# Patient Record
Sex: FEMALE | Race: BLACK OR AFRICAN AMERICAN
[De-identification: names, ages, dates, MRNs, and addresses within clinical notes are randomized per-mention and may not be internally consistent; named-entity substitution may affect disease eponyms.]

---

## 2017-01-24 ENCOUNTER — HOSPITAL ENCOUNTER (OUTPATIENT)
Dept: HOSPITAL 62 - OD | Age: 82
End: 2017-01-24
Attending: INTERNAL MEDICINE
Payer: MEDICARE

## 2017-01-24 DIAGNOSIS — Z79.899: ICD-10-CM

## 2017-01-24 DIAGNOSIS — Z79.01: Primary | ICD-10-CM

## 2017-01-24 LAB
ALBUMIN SERPL-MCNC: 3.7 G/DL (ref 3.5–5)
ALP SERPL-CCNC: 107 U/L (ref 38–126)
ALT SERPL-CCNC: 27 U/L (ref 9–52)
ANION GAP SERPL CALC-SCNC: 9 MMOL/L (ref 5–19)
APPEARANCE UR: (no result)
AST SERPL-CCNC: 27 U/L (ref 14–36)
BASOPHILS # BLD AUTO: 0 10^3/UL (ref 0–0.2)
BASOPHILS NFR BLD AUTO: 0.9 % (ref 0–2)
BILIRUB DIRECT SERPL-MCNC: 0 MG/DL (ref 0–0.3)
BILIRUB SERPL-MCNC: 0.7 MG/DL (ref 0.2–1.3)
BILIRUB UR QL STRIP: NEGATIVE
BUN SERPL-MCNC: 22 MG/DL (ref 7–20)
CALCIUM: 10.7 MG/DL (ref 8.4–10.2)
CHLORIDE SERPL-SCNC: 102 MMOL/L (ref 98–107)
CO2 SERPL-SCNC: 31 MMOL/L (ref 22–30)
CREAT SERPL-MCNC: 0.76 MG/DL (ref 0.52–1.25)
EOSINOPHIL # BLD AUTO: 0.1 10^3/UL (ref 0–0.6)
EOSINOPHIL NFR BLD AUTO: 2.6 % (ref 0–6)
ERYTHROCYTE [DISTWIDTH] IN BLOOD BY AUTOMATED COUNT: 14.1 % (ref 11.5–14)
GLUCOSE SERPL-MCNC: 86 MG/DL (ref 75–110)
GLUCOSE UR STRIP-MCNC: NEGATIVE MG/DL
HCT VFR BLD CALC: 37.5 % (ref 36–47)
HGB BLD-MCNC: 12.3 G/DL (ref 12–15.5)
HGB HCT DIFFERENCE: -0.6
KETONES UR STRIP-MCNC: NEGATIVE MG/DL
LYMPHOCYTES # BLD AUTO: 1.7 10^3/UL (ref 0.5–4.7)
LYMPHOCYTES NFR BLD AUTO: 40.8 % (ref 13–45)
MCH RBC QN AUTO: 32.4 PG (ref 27–33.4)
MCHC RBC AUTO-ENTMCNC: 32.8 G/DL (ref 32–36)
MCV RBC AUTO: 99 FL (ref 80–97)
MONOCYTES # BLD AUTO: 0.5 10^3/UL (ref 0.1–1.4)
MONOCYTES NFR BLD AUTO: 12.4 % (ref 3–13)
NEUTROPHILS # BLD AUTO: 1.8 10^3/UL (ref 1.7–8.2)
NEUTS SEG NFR BLD AUTO: 43.3 % (ref 42–78)
NITRITE UR QL STRIP: NEGATIVE
PH UR STRIP: 7 [PH] (ref 5–9)
POTASSIUM SERPL-SCNC: 5.2 MMOL/L (ref 3.6–5)
PROT SERPL-MCNC: 7.3 G/DL (ref 6.3–8.2)
PROT UR STRIP-MCNC: NEGATIVE MG/DL
RBC # BLD AUTO: 3.8 10^6/UL (ref 3.72–5.28)
SODIUM SERPL-SCNC: 142 MMOL/L (ref 137–145)
SP GR UR STRIP: 1.02
UROBILINOGEN UR-MCNC: NEGATIVE MG/DL (ref ?–2)
WBC # BLD AUTO: 4.2 10^3/UL (ref 4–10.5)

## 2017-01-24 PROCEDURE — 80076 HEPATIC FUNCTION PANEL: CPT

## 2017-01-24 PROCEDURE — 82272 OCCULT BLD FECES 1-3 TESTS: CPT

## 2017-01-24 PROCEDURE — 81001 URINALYSIS AUTO W/SCOPE: CPT

## 2017-01-24 PROCEDURE — 80048 BASIC METABOLIC PNL TOTAL CA: CPT

## 2017-01-24 PROCEDURE — 36415 COLL VENOUS BLD VENIPUNCTURE: CPT

## 2017-01-24 PROCEDURE — 85025 COMPLETE CBC W/AUTO DIFF WBC: CPT

## 2017-01-24 PROCEDURE — 85730 THROMBOPLASTIN TIME PARTIAL: CPT

## 2017-02-24 ENCOUNTER — HOSPITAL ENCOUNTER (OUTPATIENT)
Dept: HOSPITAL 62 - OD | Age: 82
End: 2017-02-24
Attending: INTERNAL MEDICINE
Payer: MEDICARE

## 2017-02-24 DIAGNOSIS — Z79.899: ICD-10-CM

## 2017-02-24 DIAGNOSIS — Z79.01: Primary | ICD-10-CM

## 2017-02-24 LAB
ANION GAP SERPL CALC-SCNC: 10 MMOL/L (ref 5–19)
BUN SERPL-MCNC: 25 MG/DL (ref 7–20)
CALCIUM: 10.7 MG/DL (ref 8.4–10.2)
CHLORIDE SERPL-SCNC: 103 MMOL/L (ref 98–107)
CO2 SERPL-SCNC: 29 MMOL/L (ref 22–30)
CREAT SERPL-MCNC: 0.79 MG/DL (ref 0.52–1.25)
ERYTHROCYTE [DISTWIDTH] IN BLOOD BY AUTOMATED COUNT: 13.6 % (ref 11.5–14)
GLUCOSE SERPL-MCNC: 94 MG/DL (ref 75–110)
HCT VFR BLD CALC: 36.5 % (ref 36–47)
HGB BLD-MCNC: 12.1 G/DL (ref 12–15.5)
HGB HCT DIFFERENCE: -0.2
MCH RBC QN AUTO: 32.6 PG (ref 27–33.4)
MCHC RBC AUTO-ENTMCNC: 33.2 G/DL (ref 32–36)
MCV RBC AUTO: 98 FL (ref 80–97)
POTASSIUM SERPL-SCNC: 4.8 MMOL/L (ref 3.6–5)
RBC # BLD AUTO: 3.72 10^6/UL (ref 3.72–5.28)
SODIUM SERPL-SCNC: 142.4 MMOL/L (ref 137–145)
WBC # BLD AUTO: 3.9 10^3/UL (ref 4–10.5)

## 2017-02-24 PROCEDURE — 80048 BASIC METABOLIC PNL TOTAL CA: CPT

## 2017-02-24 PROCEDURE — 82272 OCCULT BLD FECES 1-3 TESTS: CPT

## 2017-02-24 PROCEDURE — 36415 COLL VENOUS BLD VENIPUNCTURE: CPT

## 2017-02-24 PROCEDURE — 85027 COMPLETE CBC AUTOMATED: CPT

## 2017-04-10 ENCOUNTER — HOSPITAL ENCOUNTER (EMERGENCY)
Dept: HOSPITAL 62 - ER | Age: 82
Discharge: HOME | End: 2017-04-10
Payer: MEDICARE

## 2017-04-10 DIAGNOSIS — M54.6: Primary | ICD-10-CM

## 2017-04-10 DIAGNOSIS — M54.9: ICD-10-CM

## 2017-04-10 PROCEDURE — 93005 ELECTROCARDIOGRAM TRACING: CPT

## 2017-04-10 PROCEDURE — 93010 ELECTROCARDIOGRAM REPORT: CPT

## 2017-04-10 PROCEDURE — 99283 EMERGENCY DEPT VISIT LOW MDM: CPT

## 2017-04-10 PROCEDURE — 71020: CPT

## 2017-04-10 NOTE — ER DOCUMENT REPORT
ED Medical Screen (RME)





- General


Chief Complaint: Back Pain


Stated Complaint: BACK PAIN


Notes: 


Patient says that she's having a sharp pain in the right mid posterior thoracic 

region since about 1 PM today.  It comes and goes.  Movement seems to make it 

worse.  Did not get relief with Tylenol.  Patient recalls no injuries.  Does 

remember that about 6 AM this morning, she had an "accident", not making it to 

the toilet in time and after that had to mop the floor, but did not have any 

pain then.  Her pain began about 6 or 7 hours later.  No vomiting and no 

diarrhea.  No URI symptoms.  No cough or cold or chest congestion.  No fevers.





Patient is very tender to touch in the mid right posterior thoracic rib region.

  No anterior chest pain.


TRAVEL OUTSIDE OF THE [place] IN LAST 30 DAYS: No





- Related Data


Allergies/Adverse Reactions: 


 





amoxicillin [Amoxicillin] Allergy (Intermediate, Verified 04/10/17 19:01)


 rash, itching, swelling











Past Medical History





- Past Medical History


Cardiac Medical History: Reports: Hx Atrial Fibrillation, Hx Coronary Artery 

Disease, Hx Hypercholesterolemia, Hx Hypertension


Pulmonary Medical History: Reports: Hx Pneumonia


   Denies: Hx Tuberculosis


Neurological Medical History: 


Renal/ Medical History: Denies: Hx Peritoneal Dialysis


Musculoskeltal Medical History: Reports Hx Arthritis


Past Surgical History: Reports: Hx Abdominal Surgery, Hx Bowel Surgery, Hx 

Umbilical Hernia





- Immunizations


Immunizations up to date: No


Hx Diphtheria, Pertussis, Tetanus Vaccination: No





Physical Exam





- Vital signs


Vitals: 





 











Temp Pulse Resp BP Pulse Ox


 


 98.4 F   89   16   161/104 H  97 


 


 04/10/17 18:49  04/10/17 18:49  04/10/17 18:49  04/10/17 18:49  04/10/17 18:49














Course





- Vital Signs


Vital signs: 





 











Temp Pulse Resp BP Pulse Ox


 


 98.4 F   89   16   161/104 H  97 


 


 04/10/17 18:49  04/10/17 18:49  04/10/17 18:49  04/10/17 18:49  04/10/17 18:49

## 2017-04-10 NOTE — ER DOCUMENT REPORT
ED General





- General


Chief Complaint: Back Pain


Stated Complaint: BACK PAIN


Time seen by provider: 21:45


Notes: 


Patient is an 88-year-old female that comes emergency department with chief 

complaint of pain in her mid to upper back on the right side only, symptoms 

started at about 2 PM, she states that about 6 hours earlier she had been 

mopping but she is unsure if this is related.  She denies shortness of breath, 

chest pain, dizziness, focal numbness or weakness.  Patient states the symptoms 

are worse when she moves and are intermittently sharp. 


TRAVEL OUTSIDE OF THE [place] IN LAST 30 DAYS: No





- Related Data


Allergies/Adverse Reactions: 


 





amoxicillin [Amoxicillin] Allergy (Intermediate, Verified 04/10/17 19:01)


 rash, itching, swelling











Past Medical History





- General


Information source: Patient, Relative





- Social History


Smoking Status: Never Smoker


Frequency of alcohol use: None


Drug Abuse: None


Lives with: Family


Family History: CAD, Hypertension





- Past Medical History


Cardiac Medical History: Reports: Hx Atrial Fibrillation, Hx Coronary Artery 

Disease, Hx Hypercholesterolemia, Hx Hypertension


Pulmonary Medical History: Reports: Hx Pneumonia


   Denies: Hx Tuberculosis


Neurological Medical History: 


Renal/ Medical History: Denies: Hx Peritoneal Dialysis


Musculoskeltal Medical History: Reports Hx Arthritis


Past Surgical History: Reports: Hx Abdominal Surgery, Hx Bowel Surgery, Hx 

Umbilical Hernia





- Immunizations


Immunizations up to date: No


Hx Diphtheria, Pertussis, Tetanus Vaccination: No





Review of Systems





- Review of Systems


Constitutional: No symptoms reported


EENT: No symptoms reported


Cardiovascular: See HPI


Respiratory: No symptoms reported


Gastrointestinal: No symptoms reported


Genitourinary: No symptoms reported


Female Genitourinary: No symptoms reported


Musculoskeletal: See HPI


Skin: No symptoms reported


Hematologic/Lymphatic: No symptoms reported


Neurological/Psychological: No symptoms reported





Physical Exam





- Vital signs


Vitals: 


 











Temp Pulse Resp BP Pulse Ox


 


 98.4 F   89   16   161/104 H  97 


 


 04/10/17 18:49  04/10/17 18:49  04/10/17 18:49  04/10/17 18:49  04/10/17 18:49











Interpretation: Normal





- General


General appearance: Appears well, Alert


In distress: None - Patient winces when she moves, otherwise she is in no 

distress





- HEENT


Head: Normocephalic, Atraumatic


Eyes: Normal


Conjunctiva: Normal


Extraocular movements intact: Yes


Eyelashes: Normal


Pupils: PERRL


Mouth/Lips: Normal


Mucous membranes: Normal


Pharynx: Normal


Neck: Normal





- Respiratory


Respiratory status: No respiratory distress


Chest status: Nontender.  No: Tender


Breath sounds: Normal.  No: Decreased air movement, Wheezing


Chest palpation: Normal





- Cardiovascular


Rhythm: Regular.  No: Tachycardia


Heart sounds: Normal auscultation, S1 appreciated, S2 appreciated


Murmur: No





- Abdominal


Inspection: Normal


Distension: No distension


Bowel sounds: Normal


Tenderness: Nontender.  No: Tender, Guarding


Organomegaly: No organomegaly





- Back


Back: Tender - Patient tender in the right upper thoracic paraspinal muscle 

region with tender muscle fibers with spasm noted, full upper and lower 

extremity range of motion, normal distal neurovascular exam, no saddle 

anesthesia, no midline tenderness.





- Extremities


General upper extremity: Normal inspection, Nontender, Normal color, Normal ROM

, Normal temperature


General lower extremity: Normal inspection, Nontender, Normal color, Normal ROM

, Normal temperature, Normal weight bearing.  No: Tabatha's sign





- Neurological


Neuro grossly intact: Yes


Cognition: Normal


Orientation: AAOx4


Leonard Coma Scale Eye Opening: Spontaneous


Leonard Coma Scale Verbal: Oriented


[place]ard Coma Scale Motor: Obeys Commands


[place] Coma Scale Total: 15


Speech: Normal


Motor strength normal: LUE, RUE, LLE, RLE


Sensory: Normal





- Psychological


Associated symptoms: Normal affect, Normal mood





- Skin


Skin Temperature: Warm


Skin Moisture: Dry


Skin Color: Normal





Course





- Re-evaluation


Re-evalutation: 


EKG showing a regular rhythm, consistent with atrial fibrillation, no T-wave 

inversions in consecutive leads, no ST segment changes.  Patient has known 

history of atrial fibrillation, previous EKG shows sinus rhythm.  Patient is on 

eliquis. CXR showing stable cardiac enlargement. 





On examination patient is very specific and reproducible muscular pain in her 

right upper thoracic region, worse with movement.  No acute abnormalities noted

, no chest pain reported, patient even sits with some discomfort.  Patient is 

not tachycardic, she is generally well-appearing.  I have low suspicion of 

aortic dissection, acute coronary syndrome, PE based on examination and 

described symptoms.  Will give pain control, will place on laxative, discussed 

treatment of the area, discussed follow-up, discussed return precautions for 

any worsening symptoms, patient and daughter state understanding and agreement.





- Vital Signs


Vital signs: 


 











Temp Pulse Resp BP Pulse Ox


 


 97.4 F   61   18   165/95 H  100 


 


 04/10/17 22:24  04/10/17 22:24  04/10/17 22:24  04/10/17 22:24  04/10/17 22:24














Discharge





- Discharge


Clinical Impression: 


 Upper back pain on right side





Condition: Stable


Disposition: HOME, SELF-CARE


Additional Instructions: 


By workup and examination it appears your symptoms are musculoskeletal, rest, 

apply heat to the area, take pain medication (and stool softener to avoid 

constipation from the pain medication).


Follow-up with primary care in the next several days for additional management.


Return if you worsen in any way including difficulty breathing, chest pain, 

numbness, fever, or any other concerning symptoms.


Prescriptions: 


Docusate Sodium [Colace 100 mg Capsule] 100 mg PO DAILY #30 capsule


Hydrocodone/Acetaminophen [Norco 5-325 mg Tablet] 1 - 2 tab PO ASDIR #10 tablet


Referrals: 


MAYRA BOWLES MD [Primary Care Provider] - Follow up as needed

## 2017-04-10 NOTE — ER DOCUMENT REPORT
ED General





- General


Chief Complaint: Back Pain


Stated Complaint: BACK PAIN


TRAVEL OUTSIDE OF THE [place] IN LAST 30 DAYS: No





- Related Data


Allergies/Adverse Reactions: 


 





amoxicillin [Amoxicillin] Allergy (Intermediate, Verified 04/10/17 19:01)


 rash, itching, swelling











Past Medical History





- Social History


Family History: CAD, Hypertension





- Past Medical History


Cardiac Medical History: Reports: Hx Atrial Fibrillation, Hx Coronary Artery 

Disease, Hx Hypercholesterolemia, Hx Hypertension


Pulmonary Medical History: Reports: Hx Pneumonia


   Denies: Hx Tuberculosis


Neurological Medical History: 


Renal/ Medical History: Denies: Hx Peritoneal Dialysis


Musculoskeltal Medical History: Reports Hx Arthritis


Past Surgical History: Reports: Hx Abdominal Surgery, Hx Bowel Surgery, Hx 

Umbilical Hernia





- Immunizations


Immunizations up to date: No


Hx Diphtheria, Pertussis, Tetanus Vaccination: No





Physical Exam





- Vital signs


Vitals: 





 











Temp Pulse Resp BP Pulse Ox


 


 98.4 F   89   16   161/104 H  97 


 


 04/10/17 18:49  04/10/17 18:49  04/10/17 18:49  04/10/17 18:49  04/10/17 18:49














Course





- Re-evaluation


Re-evalutation: 


EKG showing a regular rhythm, consistent with atrial fibrillation, no T-wave 

inversions in consecutive leads, no ST segment changes.  Patient has known 

history of atrial fibrillation, previous EKG shows sinus rhythm.  Patient is on 

eliquis. CXR showing stable cardiac enlargement. 





- Vital Signs


Vital signs: 





 











Temp Pulse Resp BP Pulse Ox


 


 98.4 F   89   16   161/104 H  97 


 


 04/10/17 18:49  04/10/17 18:49  04/10/17 18:49  04/10/17 18:49  04/10/17 18:49

## 2017-04-11 VITALS — SYSTOLIC BLOOD PRESSURE: 165 MMHG | DIASTOLIC BLOOD PRESSURE: 95 MMHG

## 2017-04-11 NOTE — EKG REPORT
SEVERITY:- ABNORMAL ECG -

ATRIAL FIBRILLATION, V-RATE 57-67

INCOMPLETE RIGHT BUNDLE BRANCH BLOCK

ABNORMAL T, CONSIDER ISCHEMIA, INFERIOR LEADS

:

Confirmed by: Leland Chang MD 11-Apr-2017 08:15:49

## 2017-04-20 ENCOUNTER — HOSPITAL ENCOUNTER (OUTPATIENT)
Dept: HOSPITAL 62 - OD | Age: 82
End: 2017-04-20
Attending: INTERNAL MEDICINE
Payer: MEDICARE

## 2017-04-20 DIAGNOSIS — Z79.899: ICD-10-CM

## 2017-04-20 DIAGNOSIS — Z79.01: Primary | ICD-10-CM

## 2017-04-20 LAB
ALBUMIN SERPL-MCNC: 4.2 G/DL (ref 3.5–5)
ALP SERPL-CCNC: 89 U/L (ref 38–126)
ALT SERPL-CCNC: 28 U/L (ref 9–52)
ANION GAP SERPL CALC-SCNC: 11 MMOL/L (ref 5–19)
APPEARANCE UR: CLEAR
AST SERPL-CCNC: 26 U/L (ref 14–36)
BASOPHILS # BLD AUTO: 0 10^3/UL (ref 0–0.2)
BASOPHILS NFR BLD AUTO: 0.7 % (ref 0–2)
BILIRUB DIRECT SERPL-MCNC: 0.1 MG/DL (ref 0–0.4)
BILIRUB SERPL-MCNC: 0.8 MG/DL (ref 0.2–1.3)
BILIRUB UR QL STRIP: NEGATIVE
BUN SERPL-MCNC: 21 MG/DL (ref 7–20)
CALCIUM: 10.8 MG/DL (ref 8.4–10.2)
CHLORIDE SERPL-SCNC: 101 MMOL/L (ref 98–107)
CO2 SERPL-SCNC: 30 MMOL/L (ref 22–30)
CREAT SERPL-MCNC: 0.71 MG/DL (ref 0.52–1.25)
EOSINOPHIL # BLD AUTO: 0.1 10^3/UL (ref 0–0.6)
EOSINOPHIL NFR BLD AUTO: 2.2 % (ref 0–6)
ERYTHROCYTE [DISTWIDTH] IN BLOOD BY AUTOMATED COUNT: 13.6 % (ref 11.5–14)
GLUCOSE SERPL-MCNC: 91 MG/DL (ref 75–110)
GLUCOSE UR STRIP-MCNC: NEGATIVE MG/DL
HCT VFR BLD CALC: 37.4 % (ref 36–47)
HGB BLD-MCNC: 12.4 G/DL (ref 12–15.5)
HGB HCT DIFFERENCE: -0.2
KETONES UR STRIP-MCNC: NEGATIVE MG/DL
LYMPHOCYTES # BLD AUTO: 1.5 10^3/UL (ref 0.5–4.7)
LYMPHOCYTES NFR BLD AUTO: 43.6 % (ref 13–45)
MCH RBC QN AUTO: 32.3 PG (ref 27–33.4)
MCHC RBC AUTO-ENTMCNC: 33.2 G/DL (ref 32–36)
MCV RBC AUTO: 97 FL (ref 80–97)
MONOCYTES # BLD AUTO: 0.4 10^3/UL (ref 0.1–1.4)
MONOCYTES NFR BLD AUTO: 12.1 % (ref 3–13)
NEUTROPHILS # BLD AUTO: 1.4 10^3/UL (ref 1.7–8.2)
NEUTS SEG NFR BLD AUTO: 41.4 % (ref 42–78)
NITRITE UR QL STRIP: NEGATIVE
PH UR STRIP: 7 [PH] (ref 5–9)
POTASSIUM SERPL-SCNC: 4.9 MMOL/L (ref 3.6–5)
PROT SERPL-MCNC: 7.5 G/DL (ref 6.3–8.2)
PROT UR STRIP-MCNC: NEGATIVE MG/DL
RBC # BLD AUTO: 3.84 10^6/UL (ref 3.72–5.28)
RBC #/AREA URNS HPF: (no result) /HPF
SODIUM SERPL-SCNC: 142.4 MMOL/L (ref 137–145)
SP GR UR STRIP: 1.01
UROBILINOGEN UR-MCNC: NEGATIVE MG/DL (ref ?–2)
WBC # BLD AUTO: 3.4 10^3/UL (ref 4–10.5)
WBC #/AREA URNS HPF: (no result) /HPF

## 2017-04-20 PROCEDURE — 80076 HEPATIC FUNCTION PANEL: CPT

## 2017-04-20 PROCEDURE — 85025 COMPLETE CBC W/AUTO DIFF WBC: CPT

## 2017-04-20 PROCEDURE — 85730 THROMBOPLASTIN TIME PARTIAL: CPT

## 2017-04-20 PROCEDURE — 82272 OCCULT BLD FECES 1-3 TESTS: CPT

## 2017-04-20 PROCEDURE — 80048 BASIC METABOLIC PNL TOTAL CA: CPT

## 2017-04-20 PROCEDURE — 81001 URINALYSIS AUTO W/SCOPE: CPT

## 2017-04-20 PROCEDURE — 36415 COLL VENOUS BLD VENIPUNCTURE: CPT

## 2017-04-28 ENCOUNTER — HOSPITAL ENCOUNTER (EMERGENCY)
Dept: HOSPITAL 62 - ER | Age: 82
Discharge: HOME | End: 2017-04-28
Payer: MEDICARE

## 2017-04-28 VITALS — DIASTOLIC BLOOD PRESSURE: 74 MMHG | SYSTOLIC BLOOD PRESSURE: 144 MMHG

## 2017-04-28 DIAGNOSIS — R05: ICD-10-CM

## 2017-04-28 DIAGNOSIS — R19.7: ICD-10-CM

## 2017-04-28 DIAGNOSIS — Z88.0: ICD-10-CM

## 2017-04-28 DIAGNOSIS — I11.0: ICD-10-CM

## 2017-04-28 DIAGNOSIS — Z87.01: ICD-10-CM

## 2017-04-28 DIAGNOSIS — I48.91: ICD-10-CM

## 2017-04-28 DIAGNOSIS — J20.9: Primary | ICD-10-CM

## 2017-04-28 DIAGNOSIS — I25.10: ICD-10-CM

## 2017-04-28 DIAGNOSIS — Z79.01: ICD-10-CM

## 2017-04-28 DIAGNOSIS — R35.0: ICD-10-CM

## 2017-04-28 DIAGNOSIS — I50.9: ICD-10-CM

## 2017-04-28 LAB
ANION GAP SERPL CALC-SCNC: 12 MMOL/L (ref 5–19)
APPEARANCE UR: CLEAR
BASOPHILS # BLD AUTO: 0 10^3/UL (ref 0–0.2)
BASOPHILS NFR BLD AUTO: 0.5 % (ref 0–2)
BILIRUB UR QL STRIP: NEGATIVE
BUN SERPL-MCNC: 13 MG/DL (ref 7–20)
CALCIUM: 10.9 MG/DL (ref 8.4–10.2)
CHLORIDE SERPL-SCNC: 99 MMOL/L (ref 98–107)
CO2 SERPL-SCNC: 29 MMOL/L (ref 22–30)
CREAT SERPL-MCNC: 0.68 MG/DL (ref 0.52–1.25)
EOSINOPHIL # BLD AUTO: 0 10^3/UL (ref 0–0.6)
EOSINOPHIL NFR BLD AUTO: 0.6 % (ref 0–6)
ERYTHROCYTE [DISTWIDTH] IN BLOOD BY AUTOMATED COUNT: 13.9 % (ref 11.5–14)
GLUCOSE SERPL-MCNC: 108 MG/DL (ref 75–110)
GLUCOSE UR STRIP-MCNC: NEGATIVE MG/DL
HCT VFR BLD CALC: 37.7 % (ref 36–47)
HGB BLD-MCNC: 12.5 G/DL (ref 12–15.5)
HGB HCT DIFFERENCE: -0.2
KETONES UR STRIP-MCNC: NEGATIVE MG/DL
LYMPHOCYTES # BLD AUTO: 1 10^3/UL (ref 0.5–4.7)
LYMPHOCYTES NFR BLD AUTO: 15.8 % (ref 13–45)
MCH RBC QN AUTO: 32.3 PG (ref 27–33.4)
MCHC RBC AUTO-ENTMCNC: 33.2 G/DL (ref 32–36)
MCV RBC AUTO: 97 FL (ref 80–97)
MONOCYTES # BLD AUTO: 0.7 10^3/UL (ref 0.1–1.4)
MONOCYTES NFR BLD AUTO: 12.1 % (ref 3–13)
NEUTROPHILS # BLD AUTO: 4.3 10^3/UL (ref 1.7–8.2)
NEUTS SEG NFR BLD AUTO: 71 % (ref 42–78)
NITRITE UR QL STRIP: NEGATIVE
PH UR STRIP: 8 [PH] (ref 5–9)
POTASSIUM SERPL-SCNC: 4.5 MMOL/L (ref 3.6–5)
PROT UR STRIP-MCNC: 30 MG/DL
RBC # BLD AUTO: 3.88 10^6/UL (ref 3.72–5.28)
SODIUM SERPL-SCNC: 140.1 MMOL/L (ref 137–145)
SP GR UR STRIP: 1.01
UROBILINOGEN UR-MCNC: NEGATIVE MG/DL (ref ?–2)
WBC # BLD AUTO: 6.1 10^3/UL (ref 4–10.5)

## 2017-04-28 PROCEDURE — 36415 COLL VENOUS BLD VENIPUNCTURE: CPT

## 2017-04-28 PROCEDURE — 85025 COMPLETE CBC W/AUTO DIFF WBC: CPT

## 2017-04-28 PROCEDURE — 83880 ASSAY OF NATRIURETIC PEPTIDE: CPT

## 2017-04-28 PROCEDURE — 80048 BASIC METABOLIC PNL TOTAL CA: CPT

## 2017-04-28 PROCEDURE — 71020: CPT

## 2017-04-28 PROCEDURE — 81001 URINALYSIS AUTO W/SCOPE: CPT

## 2017-04-28 PROCEDURE — 99283 EMERGENCY DEPT VISIT LOW MDM: CPT

## 2017-04-28 NOTE — ER DOCUMENT REPORT
ED Respiratory Problem





- General


Time seen by provider: 09:47


Mode of Arrival: Wheelchair


Information source: Patient, Relative


TRAVEL OUTSIDE OF THE [place] IN LAST 30 DAYS: No





- HPI


Onset: Other - see HPI note


Similar symptoms previously: Yes


Recently seen / treated by doctor: Yes





<MAC MURRELL - Last Filed: 04/28/17 10:39>





<YOLISKAY - Last Filed: 04/28/17 11:52>





- General


Chief Complaint: Cough


Stated Complaint: COUGH


Notes: 


Patient is a 88-year-old female presenting to the emergency department for 

productive cough and congestion.  Patient states that she felt sick on 

Wednesday and her cough started Thursday.  Patient states her cough is worse 

during the daytime.  Patient also has some diarrhea, however, her daughter 

states that they've been increasing the fiber and greens in their diet.  

Patient has also had some frequent urination.  Patient states she saw Dr. Hawk 

today, her cardiologist, who sent her to the emergency department for concern 

of her cough and possible pneumonia.  Patient has had pneumonia in the past 

about 2-3 years ago, and it took her a while to get over this.  Patient has a 

history of CHF and is on Lasix, A. fib and is on elequis, and hypertension.  

Patient denies any recent weight gain or edema.  Patient's primary care 

physician is at [facility].  Patient is allergic to amoxicillin. (MAC MURRELL)





- Related Data


Allergies/Adverse Reactions: 


 





amoxicillin [Amoxicillin] Allergy (Intermediate, Verified 04/10/17 19:01)


 rash, itching, swelling











Past Medical History





- General


Information source: Patient





- Social History


Smoking Status: Unknown if Ever Smoked


Family History: CAD, Hypertension


Patient has suicidal ideation: No


Patient has homicidal ideation: No





- Past Medical History


Cardiac Medical History: Reports: Hx Atrial Fibrillation, Hx Coronary Artery 

Disease, Hx Hypercholesterolemia, Hx Hypertension


Pulmonary Medical History: Reports: Hx Pneumonia


Neurological Medical History: 


Musculoskeltal Medical History: Reports Hx Arthritis


Past Surgical History: Reports: Hx Abdominal Surgery, Hx Bowel Surgery, Hx 

Umbilical Hernia





- Immunizations


Immunizations up to date: No


Hx Diphtheria, Pertussis, Tetanus Vaccination: No





<MAC MURRELL - Last Filed: 04/28/17 10:39>





Review of Systems





- Review of Systems


Constitutional: No symptoms reported


EENT: No symptoms reported


Cardiovascular: No symptoms reported


Respiratory: See HPI, Cough, Sputum


Gastrointestinal: See HPI, Diarrhea


Genitourinary: See HPI, Dysuria


Female Genitourinary: No symptoms reported


Musculoskeletal: No symptoms reported


Skin: No symptoms reported


Hematologic/Lymphatic: No symptoms reported


Neurological/Psychological: No symptoms reported


-: Yes All other systems reviewed and negative





<MAC MURRELL - Last Filed: 04/28/17 10:39>





Physical Exam





- Vital signs


Interpretation: Normal





- General


General appearance: Appears well, Alert


In distress: Mild





- HEENT


Head: Normocephalic, Atraumatic


Eyes: Normal


Pupils: PERRL


Mucous membranes: Moist





- Respiratory


Respiratory status: No respiratory distress


Chest status: Nontender


Breath sounds: Productive cough


Chest palpation: Normal





- Cardiovascular


Rhythm: Regular


Heart sounds: Normal auscultation


Murmur: No





- Abdominal


Inspection: Normal


Distension: No distension


Bowel sounds: Normal


Tenderness: Nontender


Organomegaly: No organomegaly





- Back


Back: Normal, Nontender





- Extremities


General upper extremity: Normal inspection, Normal ROM, Normal strength


General lower extremity: Normal inspection, Normal ROM, Normal strength.  No: 

Edema





- Neurological


Neuro grossly intact: Yes


Cognition: Normal


Orientation: AAOx4


[place] Coma Scale Eye Opening: Spontaneous


[place] Coma Scale Verbal: Oriented


Leonard Coma Scale Motor: Obeys Commands


Leonard Coma Scale Total: 15


Speech: Normal





- Psychological


Associated symptoms: Normal affect, Normal mood





- Skin


Skin Temperature: Warm


Skin Moisture: Dry





<MAC MURRELL - Last Filed: 04/28/17 10:39>





Course





- Laboratory


Result Diagrams: 


 04/28/17 10:07





 04/28/17 10:07





<JUAN PABLOBENMAC - Last Filed: 04/28/17 10:39>





- Laboratory


Result Diagrams: 


 04/28/17 10:07





 04/28/17 10:07





<KAY LAGOS - Last Filed: 04/28/17 11:52>





- Re-evaluation


Re-evalutation: 





04/28/17 09:58


Patient presents emergency per 1 day history of cough productive of sputum no 

shortness of breath chest pain or pressure she has a history of high blood 

pressure on medication for that A. fib on a localized. She went to see Dr. Ford 

her cardiologist today for a follow-up appointment he noticed that she was 

coughing and had a history of pneumonia so he recommended she come here be 

evaluated. On examination the patient is well-appearing nontoxic in no acute 

distress she is not hypoxic. Her lungs are clear no respiratory distress no 

peripheral edema. When ahead and ordered a chest x-ray and labs on her. Further 

assessment and evaluation at this point. (KAY LAGOS)





- Vital Signs


Vital signs: 


 











Temp Pulse Resp BP Pulse Ox


 


 100.6 F H  93   20   141/72 H  94 


 


 04/28/17 09:29  04/28/17 09:29  04/28/17 09:29  04/28/17 09:29  04/28/17 09:29














- Laboratory


Laboratory results interpreted by me: 


 











  04/28/17 04/28/17 04/28/17





  10:07 10:07 10:07


 


Plt Count  148 L  


 


Calcium   10.9 H 


 


NT-Pro-B Natriuret Pep    4130 H


 


Urine Protein   


 


Urine Blood   














  04/28/17





  10:07


 


Plt Count 


 


Calcium 


 


NT-Pro-B Natriuret Pep 


 


Urine Protein  30 H


 


Urine Blood  SMALL H














Discharge





<MAC MURRELL - Last Filed: 04/28/17 10:39>





<KAY LAGOS - Last Filed: 04/28/17 11:52>





- Discharge


Clinical Impression: 


 acute bronchitis





Condition: Stable


Disposition: HOME, SELF-CARE


Additional Instructions: 


Bronchitis





     You have acute bronchitis.  This disease is an infection or inflammation 

of the air passageways in your lungs.  Symptoms usually include cough, low 

grade fever, shortness of breath, and wheezing.  The cough usually persists for 

a couple of weeks.


     Most cases of bronchitis get better without antibiotics.  We prescribe 

antibiotics when we believe bacteria are damaging your airways, or if there's 

high risk the bronchitis will worsen into pneumonia.


     Increase your fluid intake. A cool mist humidifier may make your lungs 

more comfortable.  An expectorant (cough medicine that loosens phlegm) can 

help.  If you smoke, STOP!!!  Recovery from bronchitis can be somewhat slow, 

but you should see improvement within a day or two.


     Repeated episodes of bronchitis may result in lung damage -- for example, 

chronic bronchitis, recurrent pneumonias, or emphysema.


     Call the doctor if you develop increasing fever, shortness of breath, 

chest pain, bloody sputum, or otherwise worsen.  If you have not improved at 

all after several days, contact the physician.








Follow-up with your primary care physician in 2-3 days return for increasing 

worsening or new symptoms





Prescriptions: 


Albuterol Sulfate [Proair HFA Inhalation Aerosol 8.5 gm MDI] 2 puff IH Q4H PRN #

1 mdi


 PRN Reason: 


Amox Tr/Potassium Clavulanate [Augmentin 875-125 Tablet] 1 tab PO BID 10 Days


Scribe Attestation: 





04/28/17 11:50


I personally performed the services described in the documentation reviewed the 

documentation recorded by my scribe in my presence and it accurately and 

completely records my words and actions (KAY LAGOS)





Scribe Documentation





- Scribe


Written by Scribe:: Mac Murrell 4/28/17 10:35


acting as scribe for :: Yolis





<MAC MURRELL - Last Filed: 04/28/17 10:39>

## 2017-04-28 NOTE — ER DOCUMENT REPORT
Doctor's Note


Notes: 





04/28/17 14:11


Pharmacy did not call me the patient came back said she was allergic to 

amoxicillin so when ahead and wrote her for some Zithromax. And printed out a 

prescription for that with instructions as well.

## 2017-07-21 ENCOUNTER — HOSPITAL ENCOUNTER (OUTPATIENT)
Dept: HOSPITAL 62 - OD | Age: 82
End: 2017-07-21
Attending: INTERNAL MEDICINE
Payer: MEDICARE

## 2017-07-21 DIAGNOSIS — Z79.899: ICD-10-CM

## 2017-07-21 DIAGNOSIS — Z79.01: Primary | ICD-10-CM

## 2017-07-21 LAB
ALBUMIN SERPL-MCNC: 3.9 G/DL (ref 3.5–5)
ALP SERPL-CCNC: 97 U/L (ref 38–126)
ALT SERPL-CCNC: 22 U/L (ref 9–52)
ANION GAP SERPL CALC-SCNC: 8 MMOL/L (ref 5–19)
APPEARANCE UR: CLEAR
AST SERPL-CCNC: 18 U/L (ref 14–36)
BASOPHILS # BLD AUTO: 0 10^3/UL (ref 0–0.2)
BASOPHILS NFR BLD AUTO: 0.8 % (ref 0–2)
BILIRUB DIRECT SERPL-MCNC: 0.2 MG/DL (ref 0–0.4)
BILIRUB SERPL-MCNC: 0.6 MG/DL (ref 0.2–1.3)
BILIRUB UR QL STRIP: NEGATIVE
BUN SERPL-MCNC: 15 MG/DL (ref 7–20)
CALCIUM: 10.2 MG/DL (ref 8.4–10.2)
CHLORIDE SERPL-SCNC: 100 MMOL/L (ref 98–107)
CO2 SERPL-SCNC: 33 MMOL/L (ref 22–30)
CREAT SERPL-MCNC: 0.88 MG/DL (ref 0.52–1.25)
EOSINOPHIL # BLD AUTO: 0.1 10^3/UL (ref 0–0.6)
EOSINOPHIL NFR BLD AUTO: 3 % (ref 0–6)
ERYTHROCYTE [DISTWIDTH] IN BLOOD BY AUTOMATED COUNT: 13.8 % (ref 11.5–14)
GLUCOSE SERPL-MCNC: 83 MG/DL (ref 75–110)
GLUCOSE UR STRIP-MCNC: NEGATIVE MG/DL
HCT VFR BLD CALC: 38 % (ref 36–47)
HGB BLD-MCNC: 12.4 G/DL (ref 12–15.5)
HGB HCT DIFFERENCE: -0.8
KETONES UR STRIP-MCNC: NEGATIVE MG/DL
LYMPHOCYTES # BLD AUTO: 1.9 10^3/UL (ref 0.5–4.7)
LYMPHOCYTES NFR BLD AUTO: 49.5 % (ref 13–45)
MCH RBC QN AUTO: 32.2 PG (ref 27–33.4)
MCHC RBC AUTO-ENTMCNC: 32.7 G/DL (ref 32–36)
MCV RBC AUTO: 99 FL (ref 80–97)
MONOCYTES # BLD AUTO: 0.5 10^3/UL (ref 0.1–1.4)
MONOCYTES NFR BLD AUTO: 14.3 % (ref 3–13)
NEUTROPHILS # BLD AUTO: 1.2 10^3/UL (ref 1.7–8.2)
NEUTS SEG NFR BLD AUTO: 32.4 % (ref 42–78)
NITRITE UR QL STRIP: NEGATIVE
PH UR STRIP: 6 [PH] (ref 5–9)
POTASSIUM SERPL-SCNC: 4.7 MMOL/L (ref 3.6–5)
PROT SERPL-MCNC: 7.3 G/DL (ref 6.3–8.2)
PROT UR STRIP-MCNC: NEGATIVE MG/DL
RBC # BLD AUTO: 3.85 10^6/UL (ref 3.72–5.28)
SODIUM SERPL-SCNC: 140.5 MMOL/L (ref 137–145)
SP GR UR STRIP: 1.01
UROBILINOGEN UR-MCNC: NEGATIVE MG/DL (ref ?–2)
WBC # BLD AUTO: 3.8 10^3/UL (ref 4–10.5)

## 2017-07-21 PROCEDURE — 80076 HEPATIC FUNCTION PANEL: CPT

## 2017-07-21 PROCEDURE — 82272 OCCULT BLD FECES 1-3 TESTS: CPT

## 2017-07-21 PROCEDURE — 85025 COMPLETE CBC W/AUTO DIFF WBC: CPT

## 2017-07-21 PROCEDURE — 80048 BASIC METABOLIC PNL TOTAL CA: CPT

## 2017-07-21 PROCEDURE — 36415 COLL VENOUS BLD VENIPUNCTURE: CPT

## 2017-07-21 PROCEDURE — 81001 URINALYSIS AUTO W/SCOPE: CPT

## 2017-07-21 PROCEDURE — 85730 THROMBOPLASTIN TIME PARTIAL: CPT

## 2017-08-09 ENCOUNTER — HOSPITAL ENCOUNTER (EMERGENCY)
Dept: HOSPITAL 62 - ER | Age: 82
LOS: 1 days | Discharge: HOME | End: 2017-08-10
Payer: MEDICARE

## 2017-08-09 DIAGNOSIS — Z88.0: ICD-10-CM

## 2017-08-09 DIAGNOSIS — I10: ICD-10-CM

## 2017-08-09 DIAGNOSIS — Z87.01: ICD-10-CM

## 2017-08-09 DIAGNOSIS — K13.79: Primary | ICD-10-CM

## 2017-08-09 DIAGNOSIS — I25.10: ICD-10-CM

## 2017-08-09 PROCEDURE — 99284 EMERGENCY DEPT VISIT MOD MDM: CPT

## 2017-08-09 PROCEDURE — 36415 COLL VENOUS BLD VENIPUNCTURE: CPT

## 2017-08-09 PROCEDURE — 84484 ASSAY OF TROPONIN QUANT: CPT

## 2017-08-09 PROCEDURE — 85610 PROTHROMBIN TIME: CPT

## 2017-08-09 PROCEDURE — 85025 COMPLETE CBC W/AUTO DIFF WBC: CPT

## 2017-08-09 PROCEDURE — 71020: CPT

## 2017-08-09 PROCEDURE — 83880 ASSAY OF NATRIURETIC PEPTIDE: CPT

## 2017-08-09 PROCEDURE — 80048 BASIC METABOLIC PNL TOTAL CA: CPT

## 2017-08-10 VITALS — DIASTOLIC BLOOD PRESSURE: 88 MMHG | SYSTOLIC BLOOD PRESSURE: 170 MMHG

## 2017-08-10 LAB
ANION GAP SERPL CALC-SCNC: 10 MMOL/L (ref 5–19)
BASOPHILS # BLD AUTO: 0 10^3/UL (ref 0–0.2)
BASOPHILS NFR BLD AUTO: 0.6 % (ref 0–2)
BUN SERPL-MCNC: 15 MG/DL (ref 7–20)
CALCIUM: 10.6 MG/DL (ref 8.4–10.2)
CHLORIDE SERPL-SCNC: 100 MMOL/L (ref 98–107)
CO2 SERPL-SCNC: 30 MMOL/L (ref 22–30)
CREAT SERPL-MCNC: 0.76 MG/DL (ref 0.52–1.25)
EOSINOPHIL # BLD AUTO: 0.1 10^3/UL (ref 0–0.6)
EOSINOPHIL NFR BLD AUTO: 2.1 % (ref 0–6)
ERYTHROCYTE [DISTWIDTH] IN BLOOD BY AUTOMATED COUNT: 13.3 % (ref 11.5–14)
GLUCOSE SERPL-MCNC: 113 MG/DL (ref 75–110)
HCT VFR BLD CALC: 36.9 % (ref 36–47)
HGB BLD-MCNC: 12.5 G/DL (ref 12–15.5)
HGB HCT DIFFERENCE: 0.6
LYMPHOCYTES # BLD AUTO: 1.3 10^3/UL (ref 0.5–4.7)
LYMPHOCYTES NFR BLD AUTO: 23.4 % (ref 13–45)
MCH RBC QN AUTO: 33.1 PG (ref 27–33.4)
MCHC RBC AUTO-ENTMCNC: 33.8 G/DL (ref 32–36)
MCV RBC AUTO: 98 FL (ref 80–97)
MONOCYTES # BLD AUTO: 0.5 10^3/UL (ref 0.1–1.4)
MONOCYTES NFR BLD AUTO: 9.6 % (ref 3–13)
NEUTROPHILS # BLD AUTO: 3.6 10^3/UL (ref 1.7–8.2)
NEUTS SEG NFR BLD AUTO: 64.3 % (ref 42–78)
POTASSIUM SERPL-SCNC: 5.2 MMOL/L (ref 3.6–5)
PROTHROMBIN TIME: 16.6 SEC (ref 11.4–15.4)
RBC # BLD AUTO: 3.76 10^6/UL (ref 3.72–5.28)
SODIUM SERPL-SCNC: 139.5 MMOL/L (ref 137–145)
TROPONIN I SERPL-MCNC: < 0.012 NG/ML
WBC # BLD AUTO: 5.5 10^3/UL (ref 4–10.5)

## 2017-08-10 NOTE — ER DOCUMENT REPORT
ED General





- General


Chief Complaint: Productive Cough


Stated Complaint: COUGHING UP BLOOD


Time Seen by Provider: 08/10/17 00:03


TRAVEL OUTSIDE OF THE [place] IN LAST 30 DAYS: No





- Related Data


Allergies/Adverse Reactions: 


 





amoxicillin [Amoxicillin] Allergy (Intermediate, Verified 08/09/17 23:46)


 rash, itching, swelling











Past Medical History





- Social History


Smoking Status: Unknown if Ever Smoked


Family History: CAD, Hypertension





- Past Medical History


Cardiac Medical History: Reports: Hx Atrial Fibrillation, Hx Coronary Artery 

Disease, Hx Hypercholesterolemia, Hx Hypertension


Pulmonary Medical History: Reports: Hx Pneumonia


   Denies: Hx Tuberculosis


Neurological Medical History: 


Renal/ Medical History: Denies: Hx Peritoneal Dialysis


Musculoskeltal Medical History: Reports Hx Arthritis


Past Surgical History: Reports: Hx Abdominal Surgery, Hx Bowel Surgery, Hx 

Umbilical Hernia





- Immunizations


Immunizations up to date: No


Hx Diphtheria, Pertussis, Tetanus Vaccination: No





Physical Exam





- Vital signs


Vitals: 


 











Temp Pulse Resp BP Pulse Ox


 


 98.4 F   93   15   175/117 H  96 


 


 08/09/17 23:41  08/09/17 23:41  08/09/17 23:41  08/09/17 23:41  08/09/17 23:41














Course





- Vital Signs


Vital signs: 


 











Temp Pulse Resp BP Pulse Ox


 


 98.4 F   87   18   170/88 H  95 


 


 08/09/17 23:41  08/10/17 01:45  08/10/17 01:45  08/10/17 01:45  08/10/17 01:45














- Laboratory


Result Diagrams: 


 08/10/17 00:23





 08/10/17 00:23


Laboratory results interpreted by me: 


 











  08/10/17 08/10/17 08/10/17





  00:23 00:23 00:23


 


MCV  98 H  


 


Plt Count  141 L  


 


PT   16.6 H 


 


Potassium    5.2 H


 


Glucose    113 H


 


Calcium    10.6 H


 


NT-Pro-B Natriuret Pep   














  08/10/17





  00:23


 


MCV 


 


Plt Count 


 


PT 


 


Potassium 


 


Glucose 


 


Calcium 


 


NT-Pro-B Natriuret Pep  1930 H














Discharge





- Discharge


Clinical Impression: 


 Oral bleeding





Disposition: HOME, SELF-CARE


Additional Instructions: 


He was seen in the ER for coughing up and spitting up blood.  The blood was 

coming from the tooth socket.  There was a clot there, with no active bleeding 

when I saw you.  Your laboratory testing was all normal except for a borderline 

high potassium which does not require further action at this time, however 

should be rechecked in a couple of days.  In terms of the diarrhea on the first 

day of diarrhea is not typical to test for C. difficile but you should get a 

test if the diarrhea lasts more than a day or 2.  These call your regular 

doctor tomorrow for a possible stool sample and a recheck of her mouth.  Please 

bite down on a moist teabag for 30 minutes at a time to help the clot stabilize 

on the tooth.  Please return to the ER for any worsening oral bleeding vomiting 

blood blood in her stool or uncontrollable oral bleeding.  It is safe to keep 

taking her Eliquis for now.


Referrals: 


MAYRA BOWLES MD [Primary Care Provider] - 08/11/17

## 2017-08-10 NOTE — RADIOLOGY REPORT (SQ)
EXAM DESCRIPTION:  CHEST PA/LAT



COMPLETED DATE/TIME:  8/10/2017 12:51 am



REASON FOR STUDY:  hemoptysis



COMPARISON:  4/28/2017.



EXAM PARAMETERS:  NUMBER OF VIEWS: two views

TECHNIQUE: Digital Frontal and Lateral radiographic views of the chest acquired.

RADIATION DOSE: NA

LIMITATIONS: none



FINDINGS:  LUNGS AND PLEURA: Moderate interstitial markings.  Moderate left lung volume.  Small eleva
tion of left hemidiaphragm.

MEDIASTINUM AND HILAR STRUCTURES: No masses or contour abnormalities.

HEART AND VASCULAR STRUCTURES: Moderate enlargement of the cardiac silhouette.  Atherosclerosis.

BONES: Moderate disc desiccation.  Mild diffuse idiopathic skeletal hyperostosis.

HARDWARE: None in the chest.

OTHER: No other significant finding.



IMPRESSION:  No acute cardiopulmonary findings.  Moderate chronic interstitial lung disease pattern. 
 Moderate cardiac enlargement.



TECHNICAL DOCUMENTATION:  JOB ID:  [id number]

 2011 [employer]- All Rights Reserved

## 2017-10-17 ENCOUNTER — HOSPITAL ENCOUNTER (OUTPATIENT)
Dept: HOSPITAL 62 - OD | Age: 82
End: 2017-10-17
Attending: INTERNAL MEDICINE
Payer: MEDICARE

## 2017-10-17 DIAGNOSIS — E78.00: ICD-10-CM

## 2017-10-17 DIAGNOSIS — Z79.01: Primary | ICD-10-CM

## 2017-10-17 DIAGNOSIS — I48.2: ICD-10-CM

## 2017-10-17 DIAGNOSIS — Z79.899: ICD-10-CM

## 2017-10-17 LAB
ALBUMIN SERPL-MCNC: 4.2 G/DL (ref 3.5–5)
ALP SERPL-CCNC: 115 U/L (ref 38–126)
ALT SERPL-CCNC: 26 U/L (ref 9–52)
ANION GAP SERPL CALC-SCNC: 10 MMOL/L (ref 5–19)
APPEARANCE UR: CLEAR
AST SERPL-CCNC: 22 U/L (ref 14–36)
BASOPHILS # BLD AUTO: 0 10^3/UL (ref 0–0.2)
BASOPHILS NFR BLD AUTO: 0.9 % (ref 0–2)
BILIRUB DIRECT SERPL-MCNC: 0.5 MG/DL (ref 0–0.4)
BILIRUB SERPL-MCNC: 0.7 MG/DL (ref 0.2–1.3)
BILIRUB UR QL STRIP: NEGATIVE
BUN SERPL-MCNC: 27 MG/DL (ref 7–20)
CALCIUM: 10.8 MG/DL (ref 8.4–10.2)
CHLORIDE SERPL-SCNC: 102 MMOL/L (ref 98–107)
CHOLEST SERPL-MCNC: 150.5 MG/DL (ref 0–200)
CO2 SERPL-SCNC: 33 MMOL/L (ref 22–30)
CREAT SERPL-MCNC: 0.75 MG/DL (ref 0.52–1.25)
DIRECT HDL: 52 MG/DL (ref 40–?)
EOSINOPHIL # BLD AUTO: 0.1 10^3/UL (ref 0–0.6)
EOSINOPHIL NFR BLD AUTO: 2.1 % (ref 0–6)
ERYTHROCYTE [DISTWIDTH] IN BLOOD BY AUTOMATED COUNT: 13.6 % (ref 11.5–14)
GLUCOSE SERPL-MCNC: 93 MG/DL (ref 75–110)
GLUCOSE UR STRIP-MCNC: NEGATIVE MG/DL
HCT VFR BLD CALC: 36.3 % (ref 36–47)
HGB BLD-MCNC: 12.4 G/DL (ref 12–15.5)
HGB HCT DIFFERENCE: 0.9
KETONES UR STRIP-MCNC: NEGATIVE MG/DL
LDLC SERPL DIRECT ASSAY-MCNC: 79 MG/DL (ref ?–100)
LYMPHOCYTES # BLD AUTO: 1.8 10^3/UL (ref 0.5–4.7)
LYMPHOCYTES NFR BLD AUTO: 46.3 % (ref 13–45)
MAGNESIUM SERPL-MCNC: 1.9 MG/DL (ref 1.6–2.3)
MCH RBC QN AUTO: 33 PG (ref 27–33.4)
MCHC RBC AUTO-ENTMCNC: 34.1 G/DL (ref 32–36)
MCV RBC AUTO: 97 FL (ref 80–97)
MONOCYTES # BLD AUTO: 0.4 10^3/UL (ref 0.1–1.4)
MONOCYTES NFR BLD AUTO: 10.9 % (ref 3–13)
NEUTROPHILS # BLD AUTO: 1.5 10^3/UL (ref 1.7–8.2)
NEUTS SEG NFR BLD AUTO: 39.8 % (ref 42–78)
NITRITE UR QL STRIP: NEGATIVE
PH UR STRIP: 7 [PH] (ref 5–9)
POTASSIUM SERPL-SCNC: 4.9 MMOL/L (ref 3.6–5)
PROT SERPL-MCNC: 7.6 G/DL (ref 6.3–8.2)
PROT UR STRIP-MCNC: NEGATIVE MG/DL
RBC # BLD AUTO: 3.75 10^6/UL (ref 3.72–5.28)
SODIUM SERPL-SCNC: 144.8 MMOL/L (ref 137–145)
SP GR UR STRIP: 1
TRIGL SERPL-MCNC: 59 MG/DL (ref ?–150)
UROBILINOGEN UR-MCNC: NEGATIVE MG/DL (ref ?–2)
VLDLC SERPL CALC-MCNC: 12 MG/DL (ref 10–31)
WBC # BLD AUTO: 3.9 10^3/UL (ref 4–10.5)

## 2017-10-17 PROCEDURE — 80048 BASIC METABOLIC PNL TOTAL CA: CPT

## 2017-10-17 PROCEDURE — 84443 ASSAY THYROID STIM HORMONE: CPT

## 2017-10-17 PROCEDURE — 81001 URINALYSIS AUTO W/SCOPE: CPT

## 2017-10-17 PROCEDURE — 83735 ASSAY OF MAGNESIUM: CPT

## 2017-10-17 PROCEDURE — 80076 HEPATIC FUNCTION PANEL: CPT

## 2017-10-17 PROCEDURE — 80061 LIPID PANEL: CPT

## 2017-10-17 PROCEDURE — 82272 OCCULT BLD FECES 1-3 TESTS: CPT

## 2017-10-17 PROCEDURE — 36415 COLL VENOUS BLD VENIPUNCTURE: CPT

## 2017-10-17 PROCEDURE — 85025 COMPLETE CBC W/AUTO DIFF WBC: CPT

## 2017-10-17 PROCEDURE — 85730 THROMBOPLASTIN TIME PARTIAL: CPT

## 2018-01-17 ENCOUNTER — HOSPITAL ENCOUNTER (OUTPATIENT)
Dept: HOSPITAL 62 - OD | Age: 83
End: 2018-01-17
Attending: INTERNAL MEDICINE
Payer: MEDICARE

## 2018-01-17 DIAGNOSIS — Z79.899: ICD-10-CM

## 2018-01-17 DIAGNOSIS — Z79.01: ICD-10-CM

## 2018-01-17 DIAGNOSIS — I48.2: Primary | ICD-10-CM

## 2018-01-17 LAB
ADD MANUAL DIFF: NO
ALBUMIN SERPL-MCNC: 3.9 G/DL (ref 3.5–5)
ALP SERPL-CCNC: 106 U/L (ref 38–126)
ALT SERPL-CCNC: 26 U/L (ref 9–52)
ANION GAP SERPL CALC-SCNC: 7 MMOL/L (ref 5–19)
APPEARANCE UR: (no result)
APTT PPP: YELLOW S
AST SERPL-CCNC: 24 U/L (ref 14–36)
BASOPHILS # BLD AUTO: 0 10^3/UL (ref 0–0.2)
BASOPHILS NFR BLD AUTO: 1.1 % (ref 0–2)
BILIRUB DIRECT SERPL-MCNC: 0.2 MG/DL (ref 0–0.4)
BILIRUB SERPL-MCNC: 0.7 MG/DL (ref 0.2–1.3)
BILIRUB UR QL STRIP: NEGATIVE
BUN SERPL-MCNC: 15 MG/DL (ref 7–20)
CALCIUM: 10.9 MG/DL (ref 8.4–10.2)
CHLORIDE SERPL-SCNC: 103 MMOL/L (ref 98–107)
CHOLEST SERPL-MCNC: 127.77 MG/DL (ref 0–200)
CO2 SERPL-SCNC: 32 MMOL/L (ref 22–30)
EOSINOPHIL # BLD AUTO: 0.1 10^3/UL (ref 0–0.6)
EOSINOPHIL NFR BLD AUTO: 2.7 % (ref 0–6)
ERYTHROCYTE [DISTWIDTH] IN BLOOD BY AUTOMATED COUNT: 13.9 % (ref 11.5–14)
GLUCOSE SERPL-MCNC: 86 MG/DL (ref 75–110)
GLUCOSE UR STRIP-MCNC: NEGATIVE MG/DL
HCT VFR BLD CALC: 34.9 % (ref 36–47)
HGB BLD-MCNC: 11.7 G/DL (ref 12–15.5)
KETONES UR STRIP-MCNC: NEGATIVE MG/DL
LDLC SERPL DIRECT ASSAY-MCNC: 59 MG/DL (ref ?–100)
LYMPHOCYTES # BLD AUTO: 1.5 10^3/UL (ref 0.5–4.7)
LYMPHOCYTES NFR BLD AUTO: 44.9 % (ref 13–45)
MAGNESIUM SERPL-MCNC: 2 MG/DL (ref 1.6–2.3)
MCH RBC QN AUTO: 32.4 PG (ref 27–33.4)
MCHC RBC AUTO-ENTMCNC: 33.6 G/DL (ref 32–36)
MCV RBC AUTO: 96 FL (ref 80–97)
MONOCYTES # BLD AUTO: 0.4 10^3/UL (ref 0.1–1.4)
MONOCYTES NFR BLD AUTO: 11.9 % (ref 3–13)
NEUTROPHILS # BLD AUTO: 1.3 10^3/UL (ref 1.7–8.2)
NEUTS SEG NFR BLD AUTO: 39.4 % (ref 42–78)
NITRITE UR QL STRIP: NEGATIVE
PH UR STRIP: 8 [PH] (ref 5–9)
PLATELET # BLD: 156 10^3/UL (ref 150–450)
POTASSIUM SERPL-SCNC: 5.1 MMOL/L (ref 3.6–5)
PROT SERPL-MCNC: 7 G/DL (ref 6.3–8.2)
PROT UR STRIP-MCNC: NEGATIVE MG/DL
RBC # BLD AUTO: 3.62 10^6/UL (ref 3.72–5.28)
SODIUM SERPL-SCNC: 141.5 MMOL/L (ref 137–145)
SP GR UR STRIP: 1.02
TOTAL CELLS COUNTED % (AUTO): 100 %
TRIGL SERPL-MCNC: 66 MG/DL (ref ?–150)
UROBILINOGEN UR-MCNC: NEGATIVE MG/DL (ref ?–2)
VLDLC SERPL CALC-MCNC: 13 MG/DL (ref 10–31)
WBC # BLD AUTO: 3.4 10^3/UL (ref 4–10.5)

## 2018-01-17 PROCEDURE — 83735 ASSAY OF MAGNESIUM: CPT

## 2018-01-17 PROCEDURE — 85730 THROMBOPLASTIN TIME PARTIAL: CPT

## 2018-01-17 PROCEDURE — 36415 COLL VENOUS BLD VENIPUNCTURE: CPT

## 2018-01-17 PROCEDURE — 85025 COMPLETE CBC W/AUTO DIFF WBC: CPT

## 2018-01-17 PROCEDURE — 82272 OCCULT BLD FECES 1-3 TESTS: CPT

## 2018-01-17 PROCEDURE — 81001 URINALYSIS AUTO W/SCOPE: CPT

## 2018-01-17 PROCEDURE — 80061 LIPID PANEL: CPT

## 2018-01-17 PROCEDURE — 80048 BASIC METABOLIC PNL TOTAL CA: CPT

## 2018-01-17 PROCEDURE — 80076 HEPATIC FUNCTION PANEL: CPT
